# Patient Record
Sex: MALE | URBAN - METROPOLITAN AREA
[De-identification: names, ages, dates, MRNs, and addresses within clinical notes are randomized per-mention and may not be internally consistent; named-entity substitution may affect disease eponyms.]

---

## 2019-02-12 ENCOUNTER — EMERGENCY (EMERGENCY)
Facility: HOSPITAL | Age: 54
LOS: 1 days | Discharge: HOME | End: 2019-02-12
Admitting: EMERGENCY MEDICINE
Payer: MEDICAID

## 2019-02-12 VITALS
SYSTOLIC BLOOD PRESSURE: 101 MMHG | TEMPERATURE: 98 F | HEART RATE: 84 BPM | RESPIRATION RATE: 16 BRPM | OXYGEN SATURATION: 95 % | DIASTOLIC BLOOD PRESSURE: 66 MMHG

## 2019-02-12 DIAGNOSIS — Z91.041 RADIOGRAPHIC DYE ALLERGY STATUS: ICD-10-CM
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
## 2019-02-12 DIAGNOSIS — M54.2 CERVICALGIA: ICD-10-CM
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
## 2019-02-12 DIAGNOSIS — S09.90XA UNSPECIFIED INJURY OF HEAD, INITIAL ENCOUNTER: ICD-10-CM
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
## 2019-02-12 DIAGNOSIS — M54.9 DORSALGIA, UNSPECIFIED: ICD-10-CM
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
## 2019-02-12 PROCEDURE — 99284 EMERGENCY DEPT VISIT MOD MDM: CPT

## 2019-02-12 PROCEDURE — 70450 CT HEAD/BRAIN W/O DYE: CPT | Mod: 26

## 2019-02-12 PROCEDURE — 72125 CT NECK SPINE W/O DYE: CPT | Mod: 26

## 2019-02-12 RX ORDER — ACETAMINOPHEN 500 MG
650 TABLET ORAL ONCE
Refills: 0 | Status: COMPLETED | OUTPATIENT
Start: 2019-02-12 | End: 2019-02-12

## 2019-02-12 RX ADMIN — Medication 650 MILLIGRAM(S): at 11:03

## 2019-02-12 NOTE — ED PROVIDER NOTE - MUSCULOSKELETAL, MLM
Spine appears normal, range of motion is not limited. No cervical neck midline tenderness. +Tenderness on bilateral paraspinal.

## 2019-02-12 NOTE — ED ADULT NURSE NOTE - CHIEF COMPLAINT QUOTE
[place] crate fell on top of his head while at work, denies any LOC, or anticoagulates. Complaining of dizziness

## 2019-02-12 NOTE — ED ADULT TRIAGE NOTE - CHIEF COMPLAINT QUOTE
[place] crate fell on top of his head while at work, denies any LOC, or anticoagulates. Complaining of dizziness

## 2019-02-12 NOTE — ED PROVIDER NOTE - CHPI ED SYMPTOMS NEG
no sensory changes, no motor deficits, no speech changes/no confusion/no dizziness/no loss of consciousness/no nausea/no vomiting/no weakness

## 2019-02-12 NOTE — ED PROVIDER NOTE - NSFOLLOWUPINSTRUCTIONS_ED_ALL_ED_FT
Follow up with primary care provider.  Take Tylenol with food for pain.    Return for changes in mental status, visual changes, weakness, numbness or other concerns.

## 2019-02-12 NOTE — ED PROVIDER NOTE - OBJECTIVE STATEMENT
53 y o male with PMHX of T-cell lymphoma and CVA presents to ED s/p head injury at 8am. Pt was organizing crates filled with magazines when 5 full crates fell on the pt's head. Notes pain on the parietal head and on the sides of his neck. Denies LOC, blurry vision, confusion, dizziness, sensory changes, motor deficits, speech changes, weakness, N/V, or other sx. 53 y o male with PMHX of T-cell lymphoma and CVA presents to ED s/p head injury at 8am. Pt was organizing crates filled with magazines when 5 full crates fell on the pt's head. Notes pain on the parietal head and on the sides of his neck. Denies LOC, blurry vision, confusion, dizziness, sensory changes, motor deficits, speech changes, weakness, N/V, or other sx.  No anticoagulants.